# Patient Record
Sex: FEMALE | Race: WHITE | NOT HISPANIC OR LATINO
[De-identification: names, ages, dates, MRNs, and addresses within clinical notes are randomized per-mention and may not be internally consistent; named-entity substitution may affect disease eponyms.]

---

## 2022-09-08 PROBLEM — Z00.00 ENCOUNTER FOR PREVENTIVE HEALTH EXAMINATION: Status: ACTIVE | Noted: 2022-09-08

## 2022-10-18 ENCOUNTER — APPOINTMENT (OUTPATIENT)
Dept: ORTHOPEDIC SURGERY | Facility: CLINIC | Age: 52
End: 2022-10-18

## 2022-10-18 VITALS — BODY MASS INDEX: 44.3 KG/M2 | HEIGHT: 63 IN | WEIGHT: 250 LBS

## 2022-10-18 DIAGNOSIS — Z98.84 BARIATRIC SURGERY STATUS: ICD-10-CM

## 2022-10-18 DIAGNOSIS — I10 ESSENTIAL (PRIMARY) HYPERTENSION: ICD-10-CM

## 2022-10-18 DIAGNOSIS — Z96.649 PRESENCE OF UNSPECIFIED ARTIFICIAL HIP JOINT: ICD-10-CM

## 2022-10-18 DIAGNOSIS — K51.90 ULCERATIVE COLITIS, UNSPECIFIED, W/OUT COMPLICATIONS: ICD-10-CM

## 2022-10-18 DIAGNOSIS — Z78.9 OTHER SPECIFIED HEALTH STATUS: ICD-10-CM

## 2022-10-18 PROCEDURE — 99214 OFFICE O/P EST MOD 30 MIN: CPT

## 2022-10-18 PROCEDURE — 99072 ADDL SUPL MATRL&STAF TM PHE: CPT

## 2022-10-18 RX ORDER — MESALAMINE 800 MG/1
800 TABLET, DELAYED RELEASE ORAL
Refills: 0 | Status: ACTIVE | COMMUNITY

## 2022-10-18 RX ORDER — LOSARTAN POTASSIUM 100 MG/1
100 TABLET, FILM COATED ORAL
Refills: 0 | Status: ACTIVE | COMMUNITY

## 2022-10-18 RX ORDER — INFLIXIMAB 100 MG/10ML
100 INJECTION, POWDER, LYOPHILIZED, FOR SOLUTION INTRAVENOUS
Refills: 0 | Status: ACTIVE | COMMUNITY

## 2022-10-18 NOTE — REASON FOR VISIT
[FreeTextEntry2] : here today for f/u right knee pain.  WC injury 11/7/13. c/o  pain 6/10, waking her at night, difficulty with stairs.  would like another series of orthovisc injections.

## 2022-10-27 ENCOUNTER — NON-APPOINTMENT (OUTPATIENT)
Age: 52
End: 2022-10-27

## 2022-11-18 ENCOUNTER — APPOINTMENT (OUTPATIENT)
Dept: ORTHOPEDIC SURGERY | Facility: CLINIC | Age: 52
End: 2022-11-18

## 2022-11-18 VITALS — WEIGHT: 250 LBS | BODY MASS INDEX: 44.3 KG/M2 | HEIGHT: 63 IN

## 2022-11-18 PROCEDURE — 99213 OFFICE O/P EST LOW 20 MIN: CPT | Mod: ACP,25

## 2022-11-18 PROCEDURE — 20610 DRAIN/INJ JOINT/BURSA W/O US: CPT | Mod: ACP

## 2022-11-18 PROCEDURE — 99072 ADDL SUPL MATRL&STAF TM PHE: CPT | Mod: ACP

## 2022-11-18 NOTE — PROCEDURE
[Large Joint Injection] : Large joint injection [Right] : of the right [Knee] : knee [Pain] : pain [Inflammation] : inflammation [X-ray evidence of Osteoarthritis on this or prior visit] : x-ray evidence of Osteoarthritis on this or prior visit [Betadine] : betadine [Ethyl Chloride sprayed topically] : ethyl chloride sprayed topically [Sterile technique used] : sterile technique used [Orthovisc (30mg)] : 30mg of Orthovisc [#1] : series #1 [] : Patient tolerated procedure well [Call if redness, pain or fever occur] : call if redness, pain or fever occur [Apply ice for 15min out of every hour for the next 12-24 hours as tolerated] : apply ice for 15 minutes out of every hour for the next 12-24 hours as tolerated [Previous OTC use and PT nontherapeutic] : patient has tried OTC's including aspirin, Ibuprofen, Aleve, etc or prescription NSAIDS, and/or exercises at home and/or physical therapy without satisfactory response [Patient had decreased mobility in the joint] : patient had decreased mobility in the joint [Risks, benefits, alternatives discussed / Verbal consent obtained] : the risks benefits, and alternatives have been discussed, and verbal consent was obtained

## 2022-11-18 NOTE — DISCUSSION/SUMMARY
[de-identified] : Patient received first Orthovisc injection in right knee in office today. Pt tolerated procedure well. Pt will follow up in one week for second injection.\par Discussed importance of non-impact exercise and muscle stretching before and after exercise. Explained the importance of ice and rest. \par Demonstrated proper stretches to help strengthen and improve ROM

## 2022-11-18 NOTE — PHYSICAL EXAM
[Right] : right knee [] : patient ambulates without assistive device [TWNoteComboBox7] : flexion 100 degrees [de-identified] : extension 0 degrees

## 2022-12-02 ENCOUNTER — APPOINTMENT (OUTPATIENT)
Dept: ORTHOPEDIC SURGERY | Facility: CLINIC | Age: 52
End: 2022-12-02

## 2022-12-02 VITALS — HEIGHT: 63 IN | BODY MASS INDEX: 44.3 KG/M2 | WEIGHT: 250 LBS

## 2022-12-02 PROCEDURE — 20610 DRAIN/INJ JOINT/BURSA W/O US: CPT | Mod: RT

## 2022-12-02 PROCEDURE — 99072 ADDL SUPL MATRL&STAF TM PHE: CPT | Mod: NC

## 2022-12-02 NOTE — PHYSICAL EXAM
[Right] : right knee [] : no deformities of patellar tendon [TWNoteComboBox7] : flexion 100 degrees [de-identified] : extension 0 degrees

## 2022-12-02 NOTE — REASON FOR VISIT
[FreeTextEntry2] : right knee orthovisc #2. Felt 25% relief from first injection. Had severe pain a few days ago. Denies pain medication.

## 2022-12-02 NOTE — PROCEDURE
[Large Joint Injection] : Large joint injection [Right] : of the right [Knee] : knee [Pain] : pain [Inflammation] : inflammation [X-ray evidence of Osteoarthritis on this or prior visit] : x-ray evidence of Osteoarthritis on this or prior visit [Betadine] : betadine [Ethyl Chloride sprayed topically] : ethyl chloride sprayed topically [Sterile technique used] : sterile technique used [Orthovisc (30mg)] : 30mg of Orthovisc [] : Patient tolerated procedure well [Call if redness, pain or fever occur] : call if redness, pain or fever occur [Apply ice for 15min out of every hour for the next 12-24 hours as tolerated] : apply ice for 15 minutes out of every hour for the next 12-24 hours as tolerated [Previous OTC use and PT nontherapeutic] : patient has tried OTC's including aspirin, Ibuprofen, Aleve, etc or prescription NSAIDS, and/or exercises at home and/or physical therapy without satisfactory response [Patient had decreased mobility in the joint] : patient had decreased mobility in the joint [Risks, benefits, alternatives discussed / Verbal consent obtained] : the risks benefits, and alternatives have been discussed, and verbal consent was obtained [#2] : series #2

## 2022-12-02 NOTE — DISCUSSION/SUMMARY
[de-identified] : Patient received second Orthovisc injection in right knee in office today. Pt tolerated procedure well. Pt will follow up in one week for third injection.\par Discussed importance of non-impact exercise and muscle stretching before and after exercise. Explained the importance of ice and rest. \par

## 2022-12-09 ENCOUNTER — APPOINTMENT (OUTPATIENT)
Dept: ORTHOPEDIC SURGERY | Facility: CLINIC | Age: 52
End: 2022-12-09

## 2022-12-09 PROCEDURE — 20610 DRAIN/INJ JOINT/BURSA W/O US: CPT

## 2022-12-09 PROCEDURE — 99072 ADDL SUPL MATRL&STAF TM PHE: CPT

## 2022-12-09 NOTE — DISCUSSION/SUMMARY
[de-identified] : Patient received third Orthovisc injection in right knee in office today. Pt tolerated procedure well. \par Discussed importance of non-impact exercise and muscle stretching before and after exercise. Explained the importance of ice and rest. \par Follow up PRN

## 2022-12-09 NOTE — PHYSICAL EXAM
[Right] : right knee [] : no deformities of patellar tendon [TWNoteComboBox7] : flexion 100 degrees [de-identified] : extension 0 degrees

## 2022-12-09 NOTE — REASON FOR VISIT
[FreeTextEntry2] : right knee orthovisc #3. Felt 50% relief from first and second injection.Taking tylenol

## 2022-12-09 NOTE — PROCEDURE
[Large Joint Injection] : Large joint injection [Right] : of the right [Knee] : knee [Pain] : pain [Inflammation] : inflammation [X-ray evidence of Osteoarthritis on this or prior visit] : x-ray evidence of Osteoarthritis on this or prior visit [Betadine] : betadine [Ethyl Chloride sprayed topically] : ethyl chloride sprayed topically [Sterile technique used] : sterile technique used [Orthovisc (30mg)] : 30mg of Orthovisc [] : Patient tolerated procedure well [Call if redness, pain or fever occur] : call if redness, pain or fever occur [Apply ice for 15min out of every hour for the next 12-24 hours as tolerated] : apply ice for 15 minutes out of every hour for the next 12-24 hours as tolerated [Previous OTC use and PT nontherapeutic] : patient has tried OTC's including aspirin, Ibuprofen, Aleve, etc or prescription NSAIDS, and/or exercises at home and/or physical therapy without satisfactory response [Patient had decreased mobility in the joint] : patient had decreased mobility in the joint [Risks, benefits, alternatives discussed / Verbal consent obtained] : the risks benefits, and alternatives have been discussed, and verbal consent was obtained [#3] : series #3

## 2023-07-13 ENCOUNTER — APPOINTMENT (OUTPATIENT)
Dept: ORTHOPEDIC SURGERY | Facility: CLINIC | Age: 53
End: 2023-07-13
Payer: OTHER MISCELLANEOUS

## 2023-07-13 PROCEDURE — 99214 OFFICE O/P EST MOD 30 MIN: CPT

## 2023-07-13 NOTE — HISTORY OF PRESENT ILLNESS
[de-identified] : Here for F/U  Right Knee Pain  W/C injury 11/7/2013,  has bee getting Gell Injections for years approx every 6 months

## 2023-07-13 NOTE — PHYSICAL EXAM
[Right] : right knee [] : patient ambulates without assistive device [FreeTextEntry9] : hyperextends 15-20* [TWNoteComboBox7] : flexion 105 degrees [de-identified] : extension -5 degrees

## 2023-08-24 ENCOUNTER — APPOINTMENT (OUTPATIENT)
Dept: ORTHOPEDIC SURGERY | Facility: CLINIC | Age: 53
End: 2023-08-24
Payer: OTHER MISCELLANEOUS

## 2023-08-24 PROCEDURE — 20610 DRAIN/INJ JOINT/BURSA W/O US: CPT | Mod: RT

## 2023-08-24 NOTE — PROCEDURE
[Large Joint Injection] : Large joint injection [Right] : of the right [Knee] : knee [Gel-Syn (16.8mg)] : 16.8mg of Gel-Syn [#1] : series #1

## 2023-08-24 NOTE — PHYSICAL EXAM
[Right] : right knee [5___] : hamstring 5[unfilled]/5 [] : patient ambulates without assistive device [FreeTextEntry9] : hyperextends 15-20* [TWNoteComboBox7] : flexion 105 degrees [de-identified] : extension -5 degrees

## 2023-09-01 ENCOUNTER — APPOINTMENT (OUTPATIENT)
Dept: ORTHOPEDIC SURGERY | Facility: CLINIC | Age: 53
End: 2023-09-01
Payer: OTHER MISCELLANEOUS

## 2023-09-01 PROCEDURE — 20610 DRAIN/INJ JOINT/BURSA W/O US: CPT | Mod: RT

## 2023-09-01 NOTE — DISCUSSION/SUMMARY
[de-identified] : Patient here for Gelsyn #2 today. Patient reports moderate improvement after first injection.  Follow up in 1 week for injection #3.

## 2023-09-01 NOTE — PHYSICAL EXAM
[Right] : right knee [5___] : hamstring 5[unfilled]/5 [] : patient ambulates without assistive device [FreeTextEntry9] : hyperextends 15-20* [TWNoteComboBox7] : flexion 105 degrees [de-identified] : extension -5 degrees

## 2023-09-01 NOTE — PROCEDURE
[Large Joint Injection] : Large joint injection [Right] : of the right [Knee] : knee [Pain] : pain [Inflammation] : inflammation [X-ray evidence of Osteoarthritis on this or prior visit] : x-ray evidence of Osteoarthritis on this or prior visit [Betadine] : betadine [Ethyl Chloride sprayed topically] : ethyl chloride sprayed topically [Sterile technique used] : sterile technique used [Gel-Syn (16.8mg)] : 16.8mg of Gel-Syn [#2] : series #2 [] : Patient tolerated procedure well [Call if redness, pain or fever occur] : call if redness, pain or fever occur [Apply ice for 15min out of every hour for the next 12-24 hours as tolerated] : apply ice for 15 minutes out of every hour for the next 12-24 hours as tolerated [Patient was advised to rest the joint(s) for ____ days] : patient was advised to rest the joint(s) for [unfilled] days [Previous OTC use and PT nontherapeutic] : patient has tried OTC's including aspirin, Ibuprofen, Aleve, etc or prescription NSAIDS, and/or exercises at home and/or physical therapy without satisfactory response [Patient had decreased mobility in the joint] : patient had decreased mobility in the joint [Risks, benefits, alternatives discussed / Verbal consent obtained] : the risks benefits, and alternatives have been discussed, and verbal consent was obtained

## 2023-09-08 ENCOUNTER — APPOINTMENT (OUTPATIENT)
Dept: ORTHOPEDIC SURGERY | Facility: CLINIC | Age: 53
End: 2023-09-08
Payer: OTHER MISCELLANEOUS

## 2023-09-08 DIAGNOSIS — M17.11 UNILATERAL PRIMARY OSTEOARTHRITIS, RIGHT KNEE: ICD-10-CM

## 2023-09-08 PROCEDURE — 99213 OFFICE O/P EST LOW 20 MIN: CPT | Mod: ACP,25

## 2023-09-08 PROCEDURE — 20610 DRAIN/INJ JOINT/BURSA W/O US: CPT | Mod: RT

## 2023-09-08 NOTE — PROCEDURE
[Large Joint Injection] : Large joint injection [Right] : of the right [Knee] : knee [Pain] : pain [Inflammation] : inflammation [X-ray evidence of Osteoarthritis on this or prior visit] : x-ray evidence of Osteoarthritis on this or prior visit [Betadine] : betadine [Ethyl Chloride sprayed topically] : ethyl chloride sprayed topically [Sterile technique used] : sterile technique used [Gel-Syn (16.8mg)] : 16.8mg of Gel-Syn [#3] : series #3 [] : Patient tolerated procedure well [Call if redness, pain or fever occur] : call if redness, pain or fever occur [Apply ice for 15min out of every hour for the next 12-24 hours as tolerated] : apply ice for 15 minutes out of every hour for the next 12-24 hours as tolerated [Patient was advised to rest the joint(s) for ____ days] : patient was advised to rest the joint(s) for [unfilled] days [Previous OTC use and PT nontherapeutic] : patient has tried OTC's including aspirin, Ibuprofen, Aleve, etc or prescription NSAIDS, and/or exercises at home and/or physical therapy without satisfactory response [Patient had decreased mobility in the joint] : patient had decreased mobility in the joint [Risks, benefits, alternatives discussed / Verbal consent obtained] : the risks benefits, and alternatives have been discussed, and verbal consent was obtained

## 2023-09-08 NOTE — PHYSICAL EXAM
[Right] : right knee [5___] : hamstring 5[unfilled]/5 [] : no extensor lag [FreeTextEntry9] : hyperextends 15-20* [TWNoteComboBox7] : flexion 105 degrees [de-identified] : extension -5 degrees

## 2023-09-08 NOTE — DISCUSSION/SUMMARY
[de-identified] : Large joint injection was performed bilaterally of the knee. The indication for this procedure was pain, inflammation of Osteoarthritis on this or prior visit. The site was prepped with betadine, ethyl chloride sprayed topically and sterile technique used. An injection of 16.8mg of Gel-Syn, series #3 was used.  Patient tolerated procedure well. Patient was advised to call if redness, pain or fever occur and apply ice for 15 minutes out of every hour for the next 12-24 hours as tolerated.    Patient has tried OTC's including aspirin, Ibuprofen, Aleve, etc or prescription NSAIDS, and/or exercises at home and/or physical therapy without satisfactory response, patient had decreased mobility in the joint and the risks benefits, and alternatives have been discussed, and verbal consent was obtained.  f/u PRN

## 2023-11-17 ENCOUNTER — APPOINTMENT (OUTPATIENT)
Dept: ORTHOPEDIC SURGERY | Facility: CLINIC | Age: 53
End: 2023-11-17
Payer: OTHER MISCELLANEOUS

## 2023-11-17 VITALS — HEIGHT: 63 IN | WEIGHT: 260 LBS | BODY MASS INDEX: 46.07 KG/M2

## 2023-11-17 DIAGNOSIS — S76.012A STRAIN OF MUSCLE, FASCIA AND TENDON OF LEFT HIP, INITIAL ENCOUNTER: ICD-10-CM

## 2023-11-17 DIAGNOSIS — Z96.642 PRESENCE OF LEFT ARTIFICIAL HIP JOINT: ICD-10-CM

## 2023-11-17 PROCEDURE — 73502 X-RAY EXAM HIP UNI 2-3 VIEWS: CPT

## 2023-11-17 PROCEDURE — 99213 OFFICE O/P EST LOW 20 MIN: CPT | Mod: ACP

## 2023-11-28 ENCOUNTER — NON-APPOINTMENT (OUTPATIENT)
Age: 53
End: 2023-11-28

## 2024-02-06 ENCOUNTER — APPOINTMENT (OUTPATIENT)
Dept: ORTHOPEDIC SURGERY | Facility: CLINIC | Age: 54
End: 2024-02-06
Payer: OTHER MISCELLANEOUS

## 2024-02-06 VITALS — HEIGHT: 63 IN | BODY MASS INDEX: 45.18 KG/M2 | WEIGHT: 255 LBS

## 2024-02-06 DIAGNOSIS — M17.12 UNILATERAL PRIMARY OSTEOARTHRITIS, LEFT KNEE: ICD-10-CM

## 2024-02-06 PROCEDURE — 20610 DRAIN/INJ JOINT/BURSA W/O US: CPT | Mod: LT

## 2024-02-06 PROCEDURE — 73564 X-RAY EXAM KNEE 4 OR MORE: CPT | Mod: LT

## 2024-02-06 PROCEDURE — 99213 OFFICE O/P EST LOW 20 MIN: CPT | Mod: 25

## 2024-02-06 NOTE — PHYSICAL EXAM
[Left] : left knee [5___] : hamstring 5[unfilled]/5 [] : no extensor lag [TWNoteComboBox7] : flexion 115 degrees [de-identified] : extension 0 degrees

## 2024-02-06 NOTE — DISCUSSION/SUMMARY
[de-identified] : Lengthy discussion regarding options was had with the patient. Nonsurgical options including but not limited to cortisone, Visco supplementation, anti-inflammatory medications (both steroidal and non-steroidal), activity modification, non-impact exercise, maintaining a healthy BMI, bracing, and icing were reviewed. Surgical options including but not limited to arthroscopy, and joint replacement were discussed as was risks, benefits and alternatives.  discussed with patient to procced with CSI in her left knee   PROCEDURE:   Large joint injection was performed of the left knee   The indication for this procedure was pain and inflammation. The site was prepped with betadine and sterile technique used. Patient tolerated procedure well. Patient was advised to call if redness, pain or fever occur, apply ice for 15 minutes out of every hour for the next 12-24 hours as tolerated and patient was advised to rest the joint(s) for 1 days.   Injection: 5cc 1% Lidocaine, 1cc 6mg Celestone   Patient has tried OTC's including aspirin, Ibuprofen, Aleve, etc or prescription NSAIDS, and/or exercises at home and/or physical therapy without satisfactory response, patient had decreased mobility in the joint and the risks benefits, and alternatives have been discussed, and verbal consent was obtained.   Patient will f/u 1 week

## 2024-02-06 NOTE — REASON FOR VISIT
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  c/o pain started to get bad on 1/19/24. saw another ortho dR fINGER nsaids and started PT.  using motrin for pain but is limited d/t stomach pain.   pain is 2/10 at rest and 6/10 when walking.

## 2024-02-06 NOTE — DISCUSSION/SUMMARY
[de-identified] : Lengthy discussion regarding options was had with the patient. Nonsurgical options including but not limited to cortisone, Visco supplementation, anti-inflammatory medications (both steroidal and non-steroidal), activity modification, non-impact exercise, maintaining a healthy BMI, bracing, and icing were reviewed. Surgical options including but not limited to arthroscopy, and joint replacement were discussed as was risks, benefits and alternatives.  discussed with patient to procced with CSI in her left knee   PROCEDURE:   Large joint injection was performed of the left knee   The indication for this procedure was pain and inflammation. The site was prepped with betadine and sterile technique used. Patient tolerated procedure well. Patient was advised to call if redness, pain or fever occur, apply ice for 15 minutes out of every hour for the next 12-24 hours as tolerated and patient was advised to rest the joint(s) for 1 days.   Injection: 5cc 1% Lidocaine, 1cc 6mg Celestone   Patient has tried OTC's including aspirin, Ibuprofen, Aleve, etc or prescription NSAIDS, and/or exercises at home and/or physical therapy without satisfactory response, patient had decreased mobility in the joint and the risks benefits, and alternatives have been discussed, and verbal consent was obtained.   Patient will f/u 1 week

## 2024-02-06 NOTE — HISTORY OF PRESENT ILLNESS
[de-identified] : Date of Injury/Onset:     1/18/24 Pain: At Rest:2 /10   With Activity:6/10 Affecting Sleep:Y Difficulty with stairs:Y Difficulty getting in and out of car:Y Sit to stand stiffness:Y Affects walking short/long distances?Y Home/Work/Recreation effected?  Y Mechanism of injury: TWISTING INJURY AT WORK  This is a Work-Related Injury being treated under Worker's Compensation. This  is not   an athletic injury occurring associated with an interscholastic or organized sports team. Quality of symptoms:C/O ANTERIOR AND POSTERIOR KNEE PAIN, SOME SWELLING, INSTABILITY, GRINDING IN KNEE Improves with:  REST  Worse with:   WALKING Have you been treated for this in the past?Y Have you had surgery for this in the past? KNEE SCOPE APPROX 2011- NO PROBLEMS WITH KNEE SINCE THEN.  OTC Medicines:MOTRIN RX medicines: Heat, Ice, Elevation:ICE CSI or Gel Injections:  (Indicate which)NO Physical Therapy/ HEP:  CURRENTLY IN PT Previous Treatment/Imaging/Studies Since Last Visit:  Reports Available for Review Today:NONE- SHE DIDNT BRING FROM PREVIOUS Out of work/sport:     HAS BEEN OUT OF WORK SINCE INJURY.  School/Sport/Position/Occupation:  JOSE

## 2024-02-06 NOTE — PHYSICAL EXAM
[Left] : left knee [5___] : hamstring 5[unfilled]/5 [] : no extensor lag [TWNoteComboBox7] : flexion 115 degrees [de-identified] : extension 0 degrees

## 2024-02-06 NOTE — HISTORY OF PRESENT ILLNESS
[de-identified] : Date of Injury/Onset:     1/18/24 Pain: At Rest:2 /10   With Activity:6/10 Affecting Sleep:Y Difficulty with stairs:Y Difficulty getting in and out of car:Y Sit to stand stiffness:Y Affects walking short/long distances?Y Home/Work/Recreation effected?  Y Mechanism of injury: TWISTING INJURY AT WORK  This is a Work-Related Injury being treated under Worker's Compensation. This  is not   an athletic injury occurring associated with an interscholastic or organized sports team. Quality of symptoms:C/O ANTERIOR AND POSTERIOR KNEE PAIN, SOME SWELLING, INSTABILITY, GRINDING IN KNEE Improves with:  REST  Worse with:   WALKING Have you been treated for this in the past?Y Have you had surgery for this in the past? KNEE SCOPE APPROX 2011- NO PROBLEMS WITH KNEE SINCE THEN.  OTC Medicines:MOTRIN RX medicines: Heat, Ice, Elevation:ICE CSI or Gel Injections:  (Indicate which)NO Physical Therapy/ HEP:  CURRENTLY IN PT Previous Treatment/Imaging/Studies Since Last Visit:  Reports Available for Review Today:NONE- SHE DIDNT BRING FROM PREVIOUS Out of work/sport:     HAS BEEN OUT OF WORK SINCE INJURY.  School/Sport/Position/Occupation:  JOSE

## 2024-02-13 ENCOUNTER — APPOINTMENT (OUTPATIENT)
Dept: ORTHOPEDIC SURGERY | Facility: CLINIC | Age: 54
End: 2024-02-13
Payer: OTHER MISCELLANEOUS

## 2024-02-13 VITALS — BODY MASS INDEX: 45.18 KG/M2 | WEIGHT: 255 LBS | HEIGHT: 63 IN

## 2024-02-13 PROCEDURE — 99213 OFFICE O/P EST LOW 20 MIN: CPT

## 2024-02-13 NOTE — DISCUSSION/SUMMARY
[de-identified] : Patient was given Rx for MRI of the left knee to further evaluate for ligamentous, muscle and/or cartilaginous injury that is suspected due to physical examination. Patient also reports history of clicking/popping sensations, decreased ROM and strength, and a feeling of instability associated with this body part. Patient was instructed to f/u after imaging for review.

## 2024-02-13 NOTE — REASON FOR VISIT
[FreeTextEntry2] : here for f/u left knee WC injury 1/18/24.  patient has not gone back to work .  .  HAD CSI LAST VISIT- HELPED. STATES SHE CAN DO STAIRS BETTER. PAIN IS 4/10.  C/O INCREASED PAIN AFTER HAVING TO SHOVEL HER CAR OUT FOR APPT TODAY.  SHE IS GOING TO PT. INCREASED ANTERIOR KNEE PAIN.

## 2024-02-21 ENCOUNTER — APPOINTMENT (OUTPATIENT)
Dept: MRI IMAGING | Facility: CLINIC | Age: 54
End: 2024-02-21
Payer: OTHER MISCELLANEOUS

## 2024-02-21 PROCEDURE — 73721 MRI JNT OF LWR EXTRE W/O DYE: CPT | Mod: LT

## 2024-02-29 ENCOUNTER — APPOINTMENT (OUTPATIENT)
Dept: ORTHOPEDIC SURGERY | Facility: CLINIC | Age: 54
End: 2024-02-29
Payer: OTHER MISCELLANEOUS

## 2024-02-29 DIAGNOSIS — M94.20 CHONDROMALACIA, UNSPECIFIED SITE: ICD-10-CM

## 2024-02-29 PROCEDURE — 99213 OFFICE O/P EST LOW 20 MIN: CPT

## 2024-03-13 NOTE — DATA REVIEWED
[MRI] : MRI [Left] : left [Knee] : knee [Report was reviewed and noted in the chart] : The report was reviewed and noted in the chart [I reviewed the films/CD and agree] : I reviewed the films/CD and agree [FreeTextEntry1] : medial meniscal tear   arthrosis with joint effusion   lateral subluxation of the patella

## 2024-03-13 NOTE — REASON FOR VISIT
[FreeTextEntry2] : F/U on left knee MRI .  states knee is doing better with PT.  pain 2/10. can do stairs now but still has some weakness.

## 2024-03-13 NOTE — DISCUSSION/SUMMARY
[de-identified] : Following discussion of the options the plan at this time is for the patient to go forward with organized therapy. Patient was provided with a Rx for PT at this time. Patient expressed understanding the treatment plan and will begin PT as soon as they can.  states knee is doing better with PT.  pain 2/10. can do stairs now but still has some weakness expect PT will help strengthening todays plan is for patient to manage pain at this time by proceeding with PT to improve ROM and taking NSAIDs PRN for pain. discussed with patient she will return back to work 3/7/24 a note was written today.   f/u PRN

## 2024-03-13 NOTE — PHYSICAL EXAM
[Left] : left knee [NL (0)] : extension 0 degrees [5___] : hamstring 5[unfilled]/5 [Negative] : negative Arlen's [] : no extensor lag [TWNoteComboBox7] : flexion 105 degrees

## 2024-03-13 NOTE — HISTORY OF PRESENT ILLNESS
[de-identified] : 2/29/24: Here for MRI Results Left Knee for injury on 1/18/24   Note   she has Hx of  KAYLA  and Needed emergency root canal  Tues and was put on ABX for 3 days.  and going Next Wednesday for Temp [place].   2/13/24 Here for f/u left knee WC injury 1/18/24.  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 HAD CSI LAST VISIT- HELPED. STATES SHE CAN DO STAIRS BETTER. PAIN IS 4/10. C/O INCREASED PAIN AFTER HAVING TO SHOVEL HER CAR OUT FOR APPT TODAY. SHE IS GOING TO PT. INCREASED ANTERIOR KNEE PAIN.

## 2024-04-25 ENCOUNTER — APPOINTMENT (OUTPATIENT)
Dept: ORTHOPEDIC SURGERY | Facility: CLINIC | Age: 54
End: 2024-04-25
Payer: OTHER MISCELLANEOUS

## 2024-04-25 VITALS — BODY MASS INDEX: 45.18 KG/M2 | WEIGHT: 255 LBS | HEIGHT: 63 IN

## 2024-04-25 DIAGNOSIS — S83.242A OTHER TEAR OF MEDIAL MENISCUS, CURRENT INJURY, LEFT KNEE, INITIAL ENCOUNTER: ICD-10-CM

## 2024-04-25 PROCEDURE — 99214 OFFICE O/P EST MOD 30 MIN: CPT

## 2024-04-25 RX ORDER — TIRZEPATIDE 2.5 MG/.5ML
2.5 INJECTION, SOLUTION SUBCUTANEOUS
Refills: 0 | Status: ACTIVE | COMMUNITY

## 2024-04-25 NOTE — HISTORY OF PRESENT ILLNESS
[de-identified] : 2/29/24: Here for MRI Results Left Knee for injury on 1/18/24   Note   she has Hx of  KAYLA  and Needed emergency root canal  Tues and was put on ABX for 3 days.  and going Next Wednesday for Temp [place].   2/13/24 Here for f/u left knee WC injury 1/18/24.  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 HAD CSI LAST VISIT- HELPED. STATES SHE CAN DO STAIRS BETTER. PAIN IS 4/10. C/O INCREASED PAIN AFTER HAVING TO SHOVEL HER CAR OUT FOR APPT TODAY. SHE IS GOING TO PT. INCREASED ANTERIOR KNEE PAIN.

## 2024-04-25 NOTE — REASON FOR VISIT
[FreeTextEntry2] : here for f/u left knee pain.  WC injury 1/18/24.  has been to PT and had continued home exercise program.  she went back to work and c/o continued pain.  WC cut off her PT and pain got worse.  patient had a CSI 2/6/24- helped for short period.  c/o doing worse now.  stairs are difficult and she is pulling herself up with handrail.

## 2024-04-25 NOTE — DISCUSSION/SUMMARY
[de-identified] : Patient has MM of the left knee  Lengthy discussion regarding options was had with the patient. Nonsurgical options including but not limited to cortisone, Visco supplementation, anti-inflammatory medications (both steroidal and non-steroidal), activity modification, non-impact exercise, maintaining a healthy BMI, bracing, and icing were reviewed. Surgical options including but not limited to arthroscopy, and joint replacement were discussed as was risks, benefits and alternatives.  I spent time going in detail the problem and the associated risks/benefits of left knee scope surgery. Went into detailed discussion of complications including but not limited to, nerve injury, non-union, repeat fracture, DVT /PE /pe postop, instability, transfusion, infection, NVA injury, stiffness, leg length discrepancy, inability to ambulate & death. Discussed implant and model shown to patient. Patient had ample time to ask any questions, and at this time answered all patient questions, should anymore arise they were instructed to follow up. Patient expressed understanding of the proposed procedure and postop directions. Patient has failed non-surgical treatment and PT is contraindicated at this time.  Todays plan is to request auth from workers comp to proceed with left knee scope.

## 2024-04-25 NOTE — PHYSICAL EXAM
[Left] : left knee [NL (0)] : extension 0 degrees [5___] : hamstring 5[unfilled]/5 [Positive] : positive Augustin [] : pain with valgus stress [FreeTextEntry8] : anterior medial pain [de-identified] : Medial positive Mc[place]rays [TWNoteComboBox7] : flexion 80 degrees

## 2024-06-04 ENCOUNTER — OFFICE (OUTPATIENT)
Dept: URBAN - METROPOLITAN AREA CLINIC 12 | Facility: CLINIC | Age: 54
Setting detail: OPHTHALMOLOGY
End: 2024-06-04
Payer: COMMERCIAL

## 2024-06-04 DIAGNOSIS — S05.02XA: ICD-10-CM

## 2024-06-04 DIAGNOSIS — H16.252: ICD-10-CM

## 2024-06-04 PROCEDURE — 92012 INTRM OPH EXAM EST PATIENT: CPT | Performed by: OPHTHALMOLOGY

## 2024-06-04 ASSESSMENT — CONFRONTATIONAL VISUAL FIELD TEST (CVF)
OS_FINDINGS: FULL
OD_FINDINGS: FULL

## 2024-06-06 ENCOUNTER — OFFICE (OUTPATIENT)
Dept: URBAN - METROPOLITAN AREA CLINIC 12 | Facility: CLINIC | Age: 54
Setting detail: OPHTHALMOLOGY
End: 2024-06-06
Payer: COMMERCIAL

## 2024-06-06 DIAGNOSIS — S05.02XA: ICD-10-CM

## 2024-06-06 DIAGNOSIS — H16.252: ICD-10-CM

## 2024-06-06 PROCEDURE — 99212 OFFICE O/P EST SF 10 MIN: CPT | Performed by: OPHTHALMOLOGY

## 2024-06-06 ASSESSMENT — CONFRONTATIONAL VISUAL FIELD TEST (CVF)
OS_FINDINGS: FULL
OD_FINDINGS: FULL

## 2024-06-08 ENCOUNTER — OFFICE (OUTPATIENT)
Dept: URBAN - METROPOLITAN AREA CLINIC 12 | Facility: CLINIC | Age: 54
Setting detail: OPHTHALMOLOGY
End: 2024-06-08
Payer: COMMERCIAL

## 2024-06-08 DIAGNOSIS — H16.252: ICD-10-CM

## 2024-06-08 DIAGNOSIS — S05.02XA: ICD-10-CM

## 2024-06-08 PROCEDURE — 99212 OFFICE O/P EST SF 10 MIN: CPT | Performed by: OPHTHALMOLOGY

## 2024-06-08 ASSESSMENT — CONFRONTATIONAL VISUAL FIELD TEST (CVF)
OD_FINDINGS: FULL
OS_FINDINGS: FULL

## 2024-06-11 ENCOUNTER — OFFICE (OUTPATIENT)
Dept: URBAN - METROPOLITAN AREA CLINIC 12 | Facility: CLINIC | Age: 54
Setting detail: OPHTHALMOLOGY
End: 2024-06-11
Payer: COMMERCIAL

## 2024-06-11 DIAGNOSIS — H16.252: ICD-10-CM

## 2024-06-11 DIAGNOSIS — S05.02XD: ICD-10-CM

## 2024-06-11 PROCEDURE — 92012 INTRM OPH EXAM EST PATIENT: CPT | Performed by: OPHTHALMOLOGY

## 2024-06-11 ASSESSMENT — CONFRONTATIONAL VISUAL FIELD TEST (CVF)
OS_FINDINGS: FULL
OD_FINDINGS: FULL

## 2024-06-18 ENCOUNTER — OFFICE (OUTPATIENT)
Dept: URBAN - METROPOLITAN AREA CLINIC 12 | Facility: CLINIC | Age: 54
Setting detail: OPHTHALMOLOGY
End: 2024-06-18
Payer: COMMERCIAL

## 2024-06-18 DIAGNOSIS — H16.252: ICD-10-CM

## 2024-06-18 DIAGNOSIS — S05.02XS: ICD-10-CM

## 2024-06-18 PROCEDURE — 99212 OFFICE O/P EST SF 10 MIN: CPT | Performed by: OPHTHALMOLOGY

## 2024-06-18 ASSESSMENT — CONFRONTATIONAL VISUAL FIELD TEST (CVF)
OS_FINDINGS: FULL
OD_FINDINGS: FULL

## 2024-06-22 ENCOUNTER — OFFICE (OUTPATIENT)
Dept: URBAN - METROPOLITAN AREA CLINIC 12 | Facility: CLINIC | Age: 54
Setting detail: OPHTHALMOLOGY
End: 2024-06-22
Payer: COMMERCIAL

## 2024-06-22 ENCOUNTER — RX ONLY (RX ONLY)
Age: 54
End: 2024-06-22

## 2024-06-22 DIAGNOSIS — H16.423: ICD-10-CM

## 2024-06-22 DIAGNOSIS — H17.9: ICD-10-CM

## 2024-06-22 DIAGNOSIS — H53.002: ICD-10-CM

## 2024-06-22 PROBLEM — H43.393 VITREOUS FLOATERS; BOTH EYES: Status: ACTIVE | Noted: 2024-06-22

## 2024-06-22 PROBLEM — H16.252: Status: RESOLVED | Noted: 2024-06-04 | Resolved: 2024-06-22

## 2024-06-22 PROBLEM — H25.13 CATARACT; BOTH EYES: Status: ACTIVE | Noted: 2024-06-22

## 2024-06-22 PROCEDURE — 92014 COMPRE OPH EXAM EST PT 1/>: CPT | Performed by: OPHTHALMOLOGY

## 2024-06-22 ASSESSMENT — CONFRONTATIONAL VISUAL FIELD TEST (CVF)
OS_FINDINGS: FULL
OD_FINDINGS: FULL

## 2024-08-01 ENCOUNTER — APPOINTMENT (OUTPATIENT)
Dept: ORTHOPEDIC SURGERY | Facility: AMBULATORY SURGERY CENTER | Age: 54
End: 2024-08-01

## 2024-08-01 PROCEDURE — 29881 ARTHRS KNE SRG MNISECTMY M/L: CPT | Mod: LT

## 2024-08-01 RX ORDER — HYDROCODONE BITARTRATE AND ACETAMINOPHEN 5; 325 MG/1; MG/1
5-325 TABLET ORAL
Qty: 30 | Refills: 0 | Status: ACTIVE | COMMUNITY
Start: 2024-08-01 | End: 1900-01-01

## 2024-08-09 ENCOUNTER — APPOINTMENT (OUTPATIENT)
Dept: ORTHOPEDIC SURGERY | Facility: CLINIC | Age: 54
End: 2024-08-09

## 2024-08-09 PROCEDURE — 99024 POSTOP FOLLOW-UP VISIT: CPT

## 2024-08-09 NOTE — REASON FOR VISIT
[FreeTextEntry2] : HERE FOR F/U 8/1/24 LEFT  KNEE SCOPE.  WC INJURY 1/18/24. USING CANE FOR AMBULATION.  C/O WAS COMING DOWN STAIRS AND BENT KNEE AND THEN FELT A POP IN KNEE.  C/O CLICKING IN KNEE. PATEINT IS NOT WORKING.

## 2024-08-09 NOTE — PHYSICAL EXAM
[Left] : left knee [5___] : hamstring 5[unfilled]/5 [] : ambulation with cane [TWNoteComboBox7] : flexion 90 degrees [de-identified] : extension 5 degrees

## 2024-08-09 NOTE — DISCUSSION/SUMMARY
[de-identified] : Discussed importance of non-impact exercise and muscle stretching before and after exercise. Explained the importance of ice and rest.  Pt is doing well and is to continue with treatment plan. Pt was shown exercises and stretches that can help increase  ROM and strength.  Patient will begin OPPT at this time, given Rx for this.  F/u in 4 weeks

## 2024-09-10 ENCOUNTER — APPOINTMENT (OUTPATIENT)
Dept: ORTHOPEDIC SURGERY | Facility: CLINIC | Age: 54
End: 2024-09-10
Payer: OTHER MISCELLANEOUS

## 2024-09-10 ENCOUNTER — APPOINTMENT (OUTPATIENT)
Dept: ORTHOPEDIC SURGERY | Facility: CLINIC | Age: 54
End: 2024-09-10

## 2024-09-10 DIAGNOSIS — M17.12 UNILATERAL PRIMARY OSTEOARTHRITIS, LEFT KNEE: ICD-10-CM

## 2024-09-10 DIAGNOSIS — Z98.890 OTHER SPECIFIED POSTPROCEDURAL STATES: ICD-10-CM

## 2024-09-10 PROCEDURE — 99024 POSTOP FOLLOW-UP VISIT: CPT

## 2024-09-10 NOTE — HISTORY OF PRESENT ILLNESS
[de-identified] :  Arthroscopy 8/1/2024 doing ok , Walking with a limp STOPPED using USING CANE FOR AMBULATION.  Only has 2 visit of PT Needs to be seen to have more Physical therapy approved  She is  exercising non impact on her own also    C/O Knee has buckled 2x while going up stairs . PATEINT IS NOT RETRUNED TO WORK.

## 2024-09-10 NOTE — DISCUSSION/SUMMARY
[de-identified] : Lengthy discussion regarding options was had with the patient. Nonsurgical options including but not limited to cortisone, Visco supplementation, Prescription anti-inflammatory medications (both steroidal and non-steroidal), activity modification, non-impact exercise, maintaining a healthy BMI, bracing, and icing were reviewed. Surgical options including joint replacement were discussed as was risks, benefits and alternatives of all the proposed treatments. Discussed also with the patient that they could also delay any immediate treatment options, and continue to observe and self care for the discussed problem. Discussed HEP as well as Rest, Ice and elevation. Patient had ample time to ask any questions about todays visit and the diagnosis, and all questions were answered. Patient understands the plan going forward.  The patient was advised of the diagnosis. The natural history of the pathology was explained in full to the patient in layman's terms. Several different treatment options were discussed and explained in full to the patient including the risks and benefits of both surgical and non-surgical treatments. All questions and concerns were answered.  Discussed importance of non-impact exercise and muscle stretching before and after exercise. Explained the importance of ice and rest.  Stretching exercises shown, Explained the importance of Range of Motion before strength.  Patient s/p left knee arthroscopy on 8/1/2024. The patient notes her knee pain has improved since the surgery but is still in pain.  Surgery photos were reviewed in office today. Intraoperatively small areas grade 4 chondromalacia, and I recommend viscosupplmenation for pain relief. Patient has had gelsyn injections in the past with relief.   At this time after discussion of the options, the patient would benefit from CONTINUED organized Physical Therapy to continue to increase overall functionality. The patient was provided with an updated Rx in office today and was instructed to attend PT for 6-8 weeks in order to increase strength and ROM. Patient agreed with this plan and will continue PT at this time.  Patient reports she is currently out of work. Patient was given a note stating she is out of work until further notice.    The plan at this time is to submit for authorization for gelsyn injections. The patient will follow up when we get authorization from .

## 2024-09-10 NOTE — PHYSICAL EXAM
[Left] : left knee [5___] : hamstring 5[unfilled]/5 [] : no deformities of patellar tendon [de-identified] : crepitus PF [TWNoteComboBox7] : flexion 105 degrees [de-identified] : extension 0 degrees

## 2024-09-24 ENCOUNTER — APPOINTMENT (OUTPATIENT)
Dept: ORTHOPEDIC SURGERY | Facility: CLINIC | Age: 54
End: 2024-09-24
Payer: OTHER MISCELLANEOUS

## 2024-09-24 VITALS — WEIGHT: 240 LBS | BODY MASS INDEX: 42.52 KG/M2 | HEIGHT: 63 IN

## 2024-09-24 DIAGNOSIS — M17.30 UNILATERAL POST-TRAUMATIC OSTEOARTHRITIS, UNSPECIFIED KNEE: ICD-10-CM

## 2024-09-24 DIAGNOSIS — M24.661 ANKYLOSIS, RIGHT KNEE: ICD-10-CM

## 2024-09-24 PROCEDURE — 73564 X-RAY EXAM KNEE 4 OR MORE: CPT | Mod: RT

## 2024-09-24 PROCEDURE — 99213 OFFICE O/P EST LOW 20 MIN: CPT

## 2024-09-24 NOTE — DISCUSSION/SUMMARY
[de-identified] : Lengthy discussion regarding options was had with the patient. Nonsurgical options including but not limited to cortisone, Visco supplementation, Prescription anti-inflammatory medications (both steroidal and non-steroidal), activity modification, non-impact exercise, maintaining a healthy BMI, bracing, and icing were reviewed. Surgical options including but not limited to arthroscopy, and joint replacement were discussed as was risks, benefits and alternatives of all the proposed treatments. Discussed also with the patient that they could also delay any immediate treatment options, and continue to observe and self care for the discussed problem. Discussed HEP as well as Rest, Ice and elevation. Patient had ample time to ask any questions about todays visit and the diagnosis, and all questions were answered. Patient understands the plan going forward.   PLAN IS FOR    Visco supplementation RIGHT KNEE WILL APPLY FOR AUTH W/C

## 2024-09-24 NOTE — DISCUSSION/SUMMARY
Patient has reviewed their COVID-19 lab results via [facility]   [de-identified] : Lengthy discussion regarding options was had with the patient. Nonsurgical options including but not limited to cortisone, Visco supplementation, Prescription anti-inflammatory medications (both steroidal and non-steroidal), activity modification, non-impact exercise, maintaining a healthy BMI, bracing, and icing were reviewed. Surgical options including but not limited to arthroscopy, and joint replacement were discussed as was risks, benefits and alternatives of all the proposed treatments. Discussed also with the patient that they could also delay any immediate treatment options, and continue to observe and self care for the discussed problem. Discussed HEP as well as Rest, Ice and elevation. Patient had ample time to ask any questions about todays visit and the diagnosis, and all questions were answered. Patient understands the plan going forward.   PLAN IS FOR    Visco supplementation RIGHT KNEE WILL APPLY FOR AUTH W/C

## 2024-09-24 NOTE — PHYSICAL EXAM
[5___] : hamstring 5[unfilled]/5 [] : no extensor lag [Right] : right knee [All Views] : anteroposterior, lateral, skyline, and anteroposterior standing [FreeTextEntry3] : VARUS ON X RAY  [FreeTextEntry9] : Medial joint space narrowing. Sclerosis of the medial knee. Varus deformity. Severe DJD medial compartment. LARGE Tricompartmental osteophyte formation. No fractures seen.      [TWNoteComboBox7] : flexion 95 degrees [de-identified] : extension 0 degrees

## 2024-09-24 NOTE — PHYSICAL EXAM
[5___] : hamstring 5[unfilled]/5 [] : no extensor lag [Right] : right knee [All Views] : anteroposterior, lateral, skyline, and anteroposterior standing [FreeTextEntry3] : VARUS ON X RAY  [FreeTextEntry9] : Medial joint space narrowing. Sclerosis of the medial knee. Varus deformity. Severe DJD medial compartment. LARGE Tricompartmental osteophyte formation. No fractures seen.      [TWNoteComboBox7] : flexion 95 degrees [de-identified] : extension 0 degrees

## 2024-09-24 NOTE — REASON FOR VISIT
[FreeTextEntry2] : HERE FOR RIGHT KNEE WC INJURY 11/7/13. C/O PAIN IN RIGHT KNEE 6/10 , PAIN KEEPING HER UP AT NIGHT, SWOLLEN, USING CANE FOR AMBULATION, USING ICE FOR PAIN.  NSAIDS BOTHER HER STOMACH.

## 2024-10-08 ENCOUNTER — APPOINTMENT (OUTPATIENT)
Dept: ORTHOPEDIC SURGERY | Facility: CLINIC | Age: 54
End: 2024-10-08
Payer: OTHER MISCELLANEOUS

## 2024-10-08 DIAGNOSIS — M17.30 UNILATERAL POST-TRAUMATIC OSTEOARTHRITIS, UNSPECIFIED KNEE: ICD-10-CM

## 2024-10-08 DIAGNOSIS — Z98.890 OTHER SPECIFIED POSTPROCEDURAL STATES: ICD-10-CM

## 2024-10-08 DIAGNOSIS — M24.661 ANKYLOSIS, RIGHT KNEE: ICD-10-CM

## 2024-10-08 PROCEDURE — 99024 POSTOP FOLLOW-UP VISIT: CPT

## 2024-10-18 ENCOUNTER — APPOINTMENT (OUTPATIENT)
Dept: ORTHOPEDIC SURGERY | Facility: CLINIC | Age: 54
End: 2024-10-18
Payer: OTHER MISCELLANEOUS

## 2024-10-18 DIAGNOSIS — M17.11 UNILATERAL PRIMARY OSTEOARTHRITIS, RIGHT KNEE: ICD-10-CM

## 2024-10-18 PROCEDURE — 20610 DRAIN/INJ JOINT/BURSA W/O US: CPT | Mod: RT

## 2024-10-25 ENCOUNTER — APPOINTMENT (OUTPATIENT)
Dept: ORTHOPEDIC SURGERY | Facility: CLINIC | Age: 54
End: 2024-10-25
Payer: OTHER MISCELLANEOUS

## 2024-10-25 DIAGNOSIS — M17.11 UNILATERAL PRIMARY OSTEOARTHRITIS, RIGHT KNEE: ICD-10-CM

## 2024-10-25 PROCEDURE — 20610 DRAIN/INJ JOINT/BURSA W/O US: CPT | Mod: RT

## 2024-11-01 ENCOUNTER — APPOINTMENT (OUTPATIENT)
Dept: ORTHOPEDIC SURGERY | Facility: CLINIC | Age: 54
End: 2024-11-01

## 2024-11-01 DIAGNOSIS — M17.11 UNILATERAL PRIMARY OSTEOARTHRITIS, RIGHT KNEE: ICD-10-CM

## 2024-11-01 PROCEDURE — 20610 DRAIN/INJ JOINT/BURSA W/O US: CPT | Mod: RT

## 2024-11-08 ENCOUNTER — APPOINTMENT (OUTPATIENT)
Dept: ORTHOPEDIC SURGERY | Facility: CLINIC | Age: 54
End: 2024-11-08
Payer: OTHER MISCELLANEOUS

## 2024-11-08 PROCEDURE — 20610 DRAIN/INJ JOINT/BURSA W/O US: CPT | Mod: LT

## 2024-11-12 ENCOUNTER — APPOINTMENT (OUTPATIENT)
Dept: ORTHOPEDIC SURGERY | Facility: CLINIC | Age: 54
End: 2024-11-12

## 2024-11-15 ENCOUNTER — APPOINTMENT (OUTPATIENT)
Dept: ORTHOPEDIC SURGERY | Facility: CLINIC | Age: 54
End: 2024-11-15
Payer: OTHER MISCELLANEOUS

## 2024-11-15 DIAGNOSIS — M24.661 ANKYLOSIS, RIGHT KNEE: ICD-10-CM

## 2024-11-15 PROCEDURE — 20610 DRAIN/INJ JOINT/BURSA W/O US: CPT | Mod: LT

## 2024-11-19 ENCOUNTER — APPOINTMENT (OUTPATIENT)
Dept: ORTHOPEDIC SURGERY | Facility: CLINIC | Age: 54
End: 2024-11-19

## 2024-11-22 ENCOUNTER — APPOINTMENT (OUTPATIENT)
Dept: ORTHOPEDIC SURGERY | Facility: CLINIC | Age: 54
End: 2024-11-22
Payer: OTHER MISCELLANEOUS

## 2024-11-22 DIAGNOSIS — M17.12 UNILATERAL PRIMARY OSTEOARTHRITIS, LEFT KNEE: ICD-10-CM

## 2024-11-22 PROCEDURE — 20610 DRAIN/INJ JOINT/BURSA W/O US: CPT | Mod: LT

## 2024-11-26 ENCOUNTER — APPOINTMENT (OUTPATIENT)
Dept: ORTHOPEDIC SURGERY | Facility: CLINIC | Age: 54
End: 2024-11-26

## 2024-12-07 ENCOUNTER — OFFICE (OUTPATIENT)
Dept: URBAN - METROPOLITAN AREA CLINIC 12 | Facility: CLINIC | Age: 54
Setting detail: OPHTHALMOLOGY
End: 2024-12-07
Payer: COMMERCIAL

## 2024-12-07 DIAGNOSIS — H53.002: ICD-10-CM

## 2024-12-07 DIAGNOSIS — H16.423: ICD-10-CM

## 2024-12-07 DIAGNOSIS — H43.393: ICD-10-CM

## 2024-12-07 DIAGNOSIS — H17.9: ICD-10-CM

## 2024-12-07 DIAGNOSIS — H25.13: ICD-10-CM

## 2024-12-07 PROCEDURE — 99214 OFFICE O/P EST MOD 30 MIN: CPT | Performed by: OPHTHALMOLOGY

## 2024-12-07 ASSESSMENT — REFRACTION_AUTOREFRACTION
OS_SPHERE: -8.00
OD_AXIS: 018
OS_AXIS: 177
OD_SPHERE: -6.75
OS_CYLINDER: -1.00
OD_CYLINDER: -1.00

## 2024-12-07 ASSESSMENT — REFRACTION_MANIFEST
OS_VA1: 20/25
OD_VA1: 20/25
OD_CYLINDER: -1.00
OS_SPHERE: -8.00
OS_CYLINDER: -1.00
OS_AXIS: 177
OD_SPHERE: -6.75
OD_AXIS: 018

## 2024-12-07 ASSESSMENT — VISUAL ACUITY
OS_BCVA: 20/25
OD_BCVA: 20/40

## 2024-12-07 ASSESSMENT — REFRACTION_CURRENTRX
OS_OVR_VA: 20/
OD_OVR_VA: 20/
OS_AXIS: 169
OD_CYLINDER: -0.75
OD_SPHERE: -6.75
OS_CYLINDER: -1.25
OD_AXIS: 014
OS_VPRISM_DIRECTION: SV
OS_SPHERE: -7.25
OD_VPRISM_DIRECTION: SV

## 2024-12-07 ASSESSMENT — KERATOMETRY
OS_K1POWER_DIOPTERS: 46.00
OS_K2POWER_DIOPTERS: 47.25
OD_K2POWER_DIOPTERS: 45.75
OD_AXISANGLE_DEGREES: 104
OS_AXISANGLE_DEGREES: 104
OD_K1POWER_DIOPTERS: 45.25

## 2024-12-07 ASSESSMENT — TONOMETRY
OS_IOP_MMHG: 17
OD_IOP_MMHG: 18

## 2024-12-07 ASSESSMENT — CONFRONTATIONAL VISUAL FIELD TEST (CVF)
OS_FINDINGS: FULL
OD_FINDINGS: FULL

## 2024-12-07 ASSESSMENT — VASCULARIZATION
OD_VASCULARIZATION: PANNUS
OS_VASCULARIZATION: PANNUS

## 2025-03-20 ENCOUNTER — OFFICE (OUTPATIENT)
Dept: URBAN - METROPOLITAN AREA CLINIC 12 | Facility: CLINIC | Age: 55
Setting detail: OPHTHALMOLOGY
End: 2025-03-20
Payer: COMMERCIAL

## 2025-03-20 DIAGNOSIS — H25.12: ICD-10-CM

## 2025-03-20 DIAGNOSIS — H25.13: ICD-10-CM

## 2025-03-20 PROCEDURE — 99213 OFFICE O/P EST LOW 20 MIN: CPT | Performed by: OPHTHALMOLOGY

## 2025-03-20 PROCEDURE — 92136 OPHTHALMIC BIOMETRY: CPT | Mod: LT | Performed by: OPHTHALMOLOGY

## 2025-03-20 ASSESSMENT — KERATOMETRY
OS_K2POWER_DIOPTERS: 46.75
OS_K1POWER_DIOPTERS: 46.00
OD_K1POWER_DIOPTERS: 45.00
OD_AXISANGLE_DEGREES: 093
OD_K2POWER_DIOPTERS: 45.75
OS_AXISANGLE_DEGREES: 108

## 2025-03-20 ASSESSMENT — REFRACTION_MANIFEST
OS_CYLINDER: -1.00
OS_VA1: 20/25
OS_AXIS: 177
OS_SPHERE: -8.00
OD_VA1: 20/25
OD_AXIS: 018
OD_SPHERE: -6.75
OD_CYLINDER: -1.00

## 2025-03-20 ASSESSMENT — CONFRONTATIONAL VISUAL FIELD TEST (CVF)
OS_FINDINGS: FULL
OD_FINDINGS: FULL

## 2025-03-20 ASSESSMENT — REFRACTION_CURRENTRX
OD_AXIS: 014
OS_VPRISM_DIRECTION: SV
OS_CYLINDER: -1.25
OD_SPHERE: -6.75
OS_AXIS: 169
OD_VPRISM_DIRECTION: SV
OS_SPHERE: -7.25
OS_OVR_VA: 20/
OD_OVR_VA: 20/
OD_CYLINDER: -0.75

## 2025-03-20 ASSESSMENT — REFRACTION_AUTOREFRACTION
OS_CYLINDER: -1.00
OD_AXIS: 010
OS_AXIS: 177
OD_CYLINDER: -1.25
OS_SPHERE: -7.50
OD_SPHERE: -7.00

## 2025-03-20 ASSESSMENT — VASCULARIZATION
OD_VASCULARIZATION: PANNUS
OS_VASCULARIZATION: PANNUS

## 2025-03-20 ASSESSMENT — TONOMETRY
OS_IOP_MMHG: 17
OD_IOP_MMHG: 16

## 2025-03-20 ASSESSMENT — VISUAL ACUITY
OD_BCVA: 20/40
OS_BCVA: 20/30

## 2025-03-28 ENCOUNTER — OFFICE (OUTPATIENT)
Dept: URBAN - METROPOLITAN AREA CLINIC 12 | Facility: CLINIC | Age: 55
Setting detail: OPHTHALMOLOGY
End: 2025-03-28
Payer: COMMERCIAL

## 2025-03-28 DIAGNOSIS — H25.13: ICD-10-CM

## 2025-03-28 DIAGNOSIS — Z01.818: ICD-10-CM

## 2025-03-28 PROCEDURE — 99212 OFFICE O/P EST SF 10 MIN: CPT

## 2025-03-31 ENCOUNTER — ASC (OUTPATIENT)
Dept: URBAN - METROPOLITAN AREA SURGERY 8 | Facility: SURGERY | Age: 55
Setting detail: OPHTHALMOLOGY
End: 2025-03-31
Payer: COMMERCIAL

## 2025-03-31 DIAGNOSIS — H52.222: ICD-10-CM

## 2025-03-31 DIAGNOSIS — H25.12: ICD-10-CM

## 2025-03-31 PROCEDURE — FEMTO PRECISION LASER CATARACT SURGERY: Mod: GY | Performed by: OPHTHALMOLOGY

## 2025-03-31 PROCEDURE — 66984 XCAPSL CTRC RMVL W/O ECP: CPT | Mod: LT | Performed by: OPHTHALMOLOGY

## 2025-04-01 ENCOUNTER — OFFICE (OUTPATIENT)
Dept: URBAN - METROPOLITAN AREA CLINIC 12 | Facility: CLINIC | Age: 55
Setting detail: OPHTHALMOLOGY
End: 2025-04-01
Payer: COMMERCIAL

## 2025-04-01 ENCOUNTER — RX ONLY (RX ONLY)
Age: 55
End: 2025-04-01

## 2025-04-01 DIAGNOSIS — H25.11: ICD-10-CM

## 2025-04-01 PROBLEM — Z01.818 ENCOUNTER FOR OTHER PREPROCEDURAL EXAMINATION: Status: ACTIVE | Noted: 2025-03-28

## 2025-04-01 PROCEDURE — 92136 OPHTHALMIC BIOMETRY: CPT | Mod: RT | Performed by: STUDENT IN AN ORGANIZED HEALTH CARE EDUCATION/TRAINING PROGRAM

## 2025-04-01 ASSESSMENT — VASCULARIZATION
OD_VASCULARIZATION: PANNUS
OS_VASCULARIZATION: PANNUS

## 2025-04-01 ASSESSMENT — KERATOMETRY
OD_K1POWER_DIOPTERS: 45.25
OS_K2POWER_DIOPTERS: 46.00
OS_AXISANGLE_DEGREES: 047
OD_K2POWER_DIOPTERS: 45.75
OS_K1POWER_DIOPTERS: 45.25
OD_AXISANGLE_DEGREES: 091

## 2025-04-01 ASSESSMENT — CONFRONTATIONAL VISUAL FIELD TEST (CVF)
OD_FINDINGS: FULL
OS_FINDINGS: FULL

## 2025-04-01 ASSESSMENT — REFRACTION_AUTOREFRACTION
OS_SPHERE: +0.75
OD_CYLINDER: -1.25
OS_CYLINDER: -0.75
OS_AXIS: 144
OD_SPHERE: -7.00
OD_AXIS: 010

## 2025-04-01 ASSESSMENT — TONOMETRY
OS_IOP_MMHG: 15
OD_IOP_MMHG: 17

## 2025-04-01 ASSESSMENT — VISUAL ACUITY
OD_BCVA: 20/25-
OS_BCVA: 20/20

## 2025-04-07 ENCOUNTER — ASC (OUTPATIENT)
Dept: URBAN - METROPOLITAN AREA SURGERY 8 | Facility: SURGERY | Age: 55
Setting detail: OPHTHALMOLOGY
End: 2025-04-07
Payer: COMMERCIAL

## 2025-04-07 DIAGNOSIS — H52.221: ICD-10-CM

## 2025-04-07 DIAGNOSIS — H25.11: ICD-10-CM

## 2025-04-07 PROCEDURE — FEMTO PRECISION LASER CATARACT SURGERY: Mod: GY | Performed by: OPHTHALMOLOGY

## 2025-04-07 PROCEDURE — 66984 XCAPSL CTRC RMVL W/O ECP: CPT | Mod: 79,RT | Performed by: OPHTHALMOLOGY

## 2025-04-08 ENCOUNTER — OFFICE (OUTPATIENT)
Dept: URBAN - METROPOLITAN AREA CLINIC 12 | Facility: CLINIC | Age: 55
Setting detail: OPHTHALMOLOGY
End: 2025-04-08
Payer: COMMERCIAL

## 2025-04-08 DIAGNOSIS — Z96.1: ICD-10-CM

## 2025-04-08 PROCEDURE — 99024 POSTOP FOLLOW-UP VISIT: CPT | Performed by: OPTOMETRIST

## 2025-04-08 ASSESSMENT — TONOMETRY
OD_IOP_MMHG: 15
OS_IOP_MMHG: 17

## 2025-04-08 ASSESSMENT — KERATOMETRY
OS_K1POWER_DIOPTERS: 45.75
OD_AXISANGLE_DEGREES: 103
OD_K1POWER_DIOPTERS: 45.00
OS_K2POWER_DIOPTERS: 46.25
OD_K2POWER_DIOPTERS: 45.25
OS_AXISANGLE_DEGREES: 089

## 2025-04-08 ASSESSMENT — VASCULARIZATION
OD_VASCULARIZATION: PANNUS
OS_VASCULARIZATION: PANNUS

## 2025-04-08 ASSESSMENT — REFRACTION_AUTOREFRACTION
OS_AXIS: 163
OS_SPHERE: +0.50
OD_AXIS: 028
OS_CYLINDER: -0.75
OD_SPHERE: +0.50
OD_CYLINDER: -0.25

## 2025-04-08 ASSESSMENT — CONFRONTATIONAL VISUAL FIELD TEST (CVF)
OD_FINDINGS: FULL
OS_FINDINGS: FULL

## 2025-04-08 ASSESSMENT — VISUAL ACUITY
OS_BCVA: 20/20-2
OD_BCVA: 20/25

## 2025-04-12 ENCOUNTER — OFFICE (OUTPATIENT)
Dept: URBAN - METROPOLITAN AREA CLINIC 12 | Facility: CLINIC | Age: 55
Setting detail: OPHTHALMOLOGY
End: 2025-04-12
Payer: COMMERCIAL

## 2025-04-12 DIAGNOSIS — Z96.1: ICD-10-CM

## 2025-04-12 PROCEDURE — 99024 POSTOP FOLLOW-UP VISIT: CPT | Performed by: OPHTHALMOLOGY

## 2025-04-12 ASSESSMENT — VASCULARIZATION
OS_VASCULARIZATION: PANNUS
OD_VASCULARIZATION: PANNUS

## 2025-04-12 ASSESSMENT — REFRACTION_AUTOREFRACTION
OD_AXIS: 175
OD_SPHERE: +0.50
OD_CYLINDER: -0.50
OS_AXIS: 165
OS_SPHERE: +0.75
OS_CYLINDER: -1.00

## 2025-04-12 ASSESSMENT — KERATOMETRY
OD_K1POWER_DIOPTERS: 45.00
OS_K2POWER_DIOPTERS: 46.50
OS_K1POWER_DIOPTERS: 45.75
OD_AXISANGLE_DEGREES: 091
OD_K2POWER_DIOPTERS: 45.25
OS_AXISANGLE_DEGREES: 084

## 2025-04-12 ASSESSMENT — CONFRONTATIONAL VISUAL FIELD TEST (CVF)
OS_FINDINGS: FULL
OD_FINDINGS: FULL

## 2025-04-12 ASSESSMENT — VISUAL ACUITY
OS_BCVA: 20/20
OD_BCVA: 20/25-2

## 2025-04-15 ENCOUNTER — RX ONLY (RX ONLY)
Age: 55
End: 2025-04-15

## 2025-04-15 ENCOUNTER — OFFICE (OUTPATIENT)
Dept: URBAN - METROPOLITAN AREA CLINIC 12 | Facility: CLINIC | Age: 55
Setting detail: OPHTHALMOLOGY
End: 2025-04-15
Payer: COMMERCIAL

## 2025-04-15 DIAGNOSIS — Z96.1: ICD-10-CM

## 2025-04-15 DIAGNOSIS — H00.11: ICD-10-CM

## 2025-04-15 PROBLEM — H52.4 PRESBYOPIA: Status: ACTIVE | Noted: 2025-04-15

## 2025-04-15 PROCEDURE — 99024 POSTOP FOLLOW-UP VISIT: CPT | Performed by: OPHTHALMOLOGY

## 2025-04-15 ASSESSMENT — KERATOMETRY
OS_AXISANGLE_DEGREES: 088
OD_AXISANGLE_DEGREES: 106
OD_K2POWER_DIOPTERS: 45.75
OS_K2POWER_DIOPTERS: 46.50
OS_K1POWER_DIOPTERS: 5.75
METHOD_AUTO_MANUAL: AUTO
OD_K1POWER_DIOPTERS: 45.25

## 2025-04-15 ASSESSMENT — VISUAL ACUITY
OS_BCVA: 20/20
OD_BCVA: 20/25

## 2025-04-15 ASSESSMENT — TONOMETRY
OD_IOP_MMHG: 16
OS_IOP_MMHG: 14

## 2025-04-15 ASSESSMENT — VASCULARIZATION
OS_VASCULARIZATION: PANNUS
OD_VASCULARIZATION: PANNUS

## 2025-04-15 ASSESSMENT — REFRACTION_AUTOREFRACTION
OD_CYLINDER: -0.75
OS_SPHERE: +0.25
OD_AXIS: 008
OD_SPHERE: PL
OS_AXIS: 166
OS_CYLINDER: -0.50

## 2025-04-15 ASSESSMENT — CONFRONTATIONAL VISUAL FIELD TEST (CVF)
OS_FINDINGS: FULL
OD_FINDINGS: FULL

## 2025-04-24 ENCOUNTER — APPOINTMENT (OUTPATIENT)
Dept: ORTHOPEDIC SURGERY | Facility: CLINIC | Age: 55
End: 2025-04-24
Payer: OTHER MISCELLANEOUS

## 2025-04-24 VITALS — HEIGHT: 63 IN | BODY MASS INDEX: 39.34 KG/M2 | WEIGHT: 222 LBS

## 2025-04-24 DIAGNOSIS — M17.11 UNILATERAL PRIMARY OSTEOARTHRITIS, RIGHT KNEE: ICD-10-CM

## 2025-04-24 PROCEDURE — 99214 OFFICE O/P EST MOD 30 MIN: CPT

## 2025-05-08 ENCOUNTER — TELEHEALTH-OTHER THEN HOME (OUTPATIENT)
Dept: URBAN - METROPOLITAN AREA CLINIC 110 | Facility: CLINIC | Age: 55
Setting detail: OPHTHALMOLOGY
End: 2025-05-08
Payer: COMMERCIAL

## 2025-05-08 ENCOUNTER — OFFICE (OUTPATIENT)
Dept: URBAN - METROPOLITAN AREA CLINIC 12 | Facility: CLINIC | Age: 55
Setting detail: OPHTHALMOLOGY
End: 2025-05-08

## 2025-05-08 DIAGNOSIS — H52.4: ICD-10-CM
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
## 2025-05-08 DIAGNOSIS — H00.11: ICD-10-CM

## 2025-05-08 DIAGNOSIS — Z96.1: ICD-10-CM

## 2025-05-08 PROCEDURE — NO SHOW FE NO SHOW FEE: Performed by: OPHTHALMOLOGY

## 2025-05-08 PROCEDURE — 99024 POSTOP FOLLOW-UP VISIT: CPT | Performed by: OPHTHALMOLOGY

## 2025-05-08 ASSESSMENT — KERATOMETRY
OD_K1POWER_DIOPTERS: 45.25
OD_AXISANGLE_DEGREES: 094
OS_K2POWER_DIOPTERS: 47.25
OD_K2POWER_DIOPTERS: 46.25
METHOD_AUTO_MANUAL: AUTO
OS_K1POWER_DIOPTERS: 46.00
OS_AXISANGLE_DEGREES: 086

## 2025-05-08 ASSESSMENT — CONFRONTATIONAL VISUAL FIELD TEST (CVF)
OD_FINDINGS: FULL
OS_FINDINGS: FULL

## 2025-05-08 ASSESSMENT — REFRACTION_AUTOREFRACTION
OD_CYLINDER: -1.00
OD_SPHERE: -0.75
OS_SPHERE: +0.25
OS_AXIS: 167
OD_AXIS: 157
OS_CYLINDER: -0.75

## 2025-05-08 ASSESSMENT — TONOMETRY
OS_IOP_MMHG: 11
OD_IOP_MMHG: 11

## 2025-05-08 ASSESSMENT — REFRACTION_CURRENTRX
OD_SPHERE: +1.75
OS_OVR_VA: 20/
OS_VPRISM_DIRECTION: SV
OD_OVR_VA: 20/
OS_SPHERE: +1.75
OD_VPRISM_DIRECTION: SV

## 2025-05-08 ASSESSMENT — VISUAL ACUITY
OS_BCVA: 20/20-1
OD_BCVA: 20/20-1

## 2025-05-08 ASSESSMENT — VASCULARIZATION
OS_VASCULARIZATION: PANNUS
OD_VASCULARIZATION: PANNUS

## 2025-05-23 ENCOUNTER — APPOINTMENT (OUTPATIENT)
Dept: ORTHOPEDIC SURGERY | Facility: CLINIC | Age: 55
End: 2025-05-23
Payer: OTHER MISCELLANEOUS

## 2025-05-23 PROCEDURE — 20610 DRAIN/INJ JOINT/BURSA W/O US: CPT | Mod: RT

## 2025-05-30 ENCOUNTER — APPOINTMENT (OUTPATIENT)
Dept: ORTHOPEDIC SURGERY | Facility: CLINIC | Age: 55
End: 2025-05-30
Payer: OTHER MISCELLANEOUS

## 2025-05-30 PROCEDURE — 20610 DRAIN/INJ JOINT/BURSA W/O US: CPT | Mod: RT

## 2025-06-06 ENCOUNTER — APPOINTMENT (OUTPATIENT)
Dept: ORTHOPEDIC SURGERY | Facility: CLINIC | Age: 55
End: 2025-06-06

## 2025-06-06 PROCEDURE — 20610 DRAIN/INJ JOINT/BURSA W/O US: CPT | Mod: RT

## 2025-08-08 ENCOUNTER — OFFICE (OUTPATIENT)
Dept: URBAN - METROPOLITAN AREA CLINIC 12 | Facility: CLINIC | Age: 55
Setting detail: OPHTHALMOLOGY
End: 2025-08-08
Payer: COMMERCIAL

## 2025-08-08 DIAGNOSIS — H52.4: ICD-10-CM

## 2025-08-08 DIAGNOSIS — Z96.1: ICD-10-CM

## 2025-08-08 DIAGNOSIS — H17.9: ICD-10-CM

## 2025-08-08 DIAGNOSIS — H43.393: ICD-10-CM

## 2025-08-08 DIAGNOSIS — H53.002: ICD-10-CM

## 2025-08-08 PROBLEM — H26.493 POSTERIOR CAPSULAR OPACIFICATION; BOTH EYES: Status: ACTIVE | Noted: 2025-08-08

## 2025-08-08 PROCEDURE — 92014 COMPRE OPH EXAM EST PT 1/>: CPT | Performed by: OPHTHALMOLOGY

## 2025-08-08 ASSESSMENT — REFRACTION_CURRENTRX
OD_VPRISM_DIRECTION: SV
OS_ADD: +1.75
OS_OVR_VA: 20/
OS_VPRISM_DIRECTION: SV
OD_OVR_VA: 20/
OD_ADD: +1.75

## 2025-08-08 ASSESSMENT — KERATOMETRY
OS_K1POWER_DIOPTERS: 46.50
OD_AXISANGLE_DEGREES: 094
OS_AXISANGLE_DEGREES: 090
METHOD_AUTO_MANUAL: AUTO
OS_K2POWER_DIOPTERS: 46.50
OD_K2POWER_DIOPTERS: 45.75
OD_K1POWER_DIOPTERS: 45.25

## 2025-08-08 ASSESSMENT — TONOMETRY
OD_IOP_MMHG: 13
OS_IOP_MMHG: 12

## 2025-08-08 ASSESSMENT — CONFRONTATIONAL VISUAL FIELD TEST (CVF)
OS_FINDINGS: FULL
OD_FINDINGS: FULL

## 2025-08-08 ASSESSMENT — VASCULARIZATION
OD_VASCULARIZATION: PANNUS
OS_VASCULARIZATION: PANNUS

## 2025-08-08 ASSESSMENT — REFRACTION_AUTOREFRACTION
OD_CYLINDER: -0.50
OS_AXIS: 155
OD_SPHERE: +0.50
OD_AXIS: 013
OS_CYLINDER: -0.50
OS_SPHERE: PLANO

## 2025-08-08 ASSESSMENT — VISUAL ACUITY
OD_BCVA: 20/20
OS_BCVA: 20/20